# Patient Record
Sex: FEMALE | Race: WHITE | NOT HISPANIC OR LATINO | URBAN - METROPOLITAN AREA
[De-identification: names, ages, dates, MRNs, and addresses within clinical notes are randomized per-mention and may not be internally consistent; named-entity substitution may affect disease eponyms.]

---

## 2024-04-18 ENCOUNTER — TRANSCRIBE ORDERS (OUTPATIENT)
Dept: ONCOLOGY | Facility: CLINIC | Age: 69
End: 2024-04-18
Payer: MEDICARE

## 2024-04-18 ENCOUNTER — PRE VISIT (OUTPATIENT)
Dept: ONCOLOGY | Facility: CLINIC | Age: 69
End: 2024-04-18
Payer: MEDICARE

## 2024-04-18 ENCOUNTER — PATIENT OUTREACH (OUTPATIENT)
Dept: SURGERY | Facility: CLINIC | Age: 69
End: 2024-04-18
Payer: MEDICARE

## 2024-04-18 DIAGNOSIS — C16.9 GASTRIC CANCER (H): Primary | ICD-10-CM

## 2024-04-18 NOTE — TELEPHONE ENCOUNTER
RECORDS STATUS - ALL OTHER DIAGNOSIS      RECORDS RECEIVED FROM: Bigg/KATHY   Appt Date: TBD NN WQ    Gastric Cancer     ABDOMINAL IMAGING (CT SCANS, MRI, US, PET SCANS)  DATING BACK TO 2018  EUS REPORT  PATHOLOGY REPORT  EGD REPORTS     Records Requested     April 18, 2024 11:08 AM  [facility]   [facility]ility  [facility]    Outcome I called the IMG Dept Ph: [phone] - the scans were done at [facility] in [place] [phone] #3- they will push the scans to [facility] PACS     Imaging Received  April 18, 2024 11:50 AM SY   Action: Images from [facility] received and resolved to PACS.        NOTES STATUS DETAILS   OFFICE NOTE from referring provider Carolina Jaimes @ ONCOLOGY ADULT    OFFICE NOTE from other specialist External: KATHY 4/4/24: Dr. Parveen Paula   OPERATIVE REPORT External: MNSAMMY/ HANNAH- Bigg 4/8/24: EGD  5/19/23: EGD   CLINICAL TRIAL TREATMENTS TO DATE [facility]- Bigg    LABS     PATHOLOGY REPORTS Report in External: MNSAMMY/ HORACE [facility]   (Slides not req due to protocol) [facility]:  4/8/24: [id number] (positive)    5/19/23: [id number] (positive)   ANYTHING RELATED TO DIAGNOSIS Complete Labs last updated on 4/16/2024 in CE   IMAGING (NEED IMAGES & REPORT)     CT SCANS Requested (CE) [facility] in [place]  CT Abdomen Pelvis 4/12/2024, 2/16/2024, 10/23/2022    ULTRASOUND Requested (CE)  [facility] in [place]  US Abdomen Limited 10/23/2022   PET Requested (CE)  [facility] in [place]  4/16/2024

## 2024-04-18 NOTE — PROGRESS NOTES
New Patient Oncology Nurse Navigator Note     Referring provider: Dr. Perry     Referring Clinic/Organization: [facility]      Referred to: Surgical Oncology -  Hepatobiliary / GI Cancers     Requested provider (if applicable): First available provider    Referral Received: 04/18/24       Evaluation for : Gastric Cancer      Clinical History (per Nurse review of records provided):      See book marked documents:     Referring MD office note  Pathology report  Imaging reports   Procedure report         Clinical Assessment / Barriers to Care (Per Nurse):    None at this time.     Records Location:     [facility] Everywhere     Records Needed:     ABDOMINAL IMAGING (CT SCANS, MRI, US, PET SCANS)  DATING BACK TO 2018  EUS REPORT  PATHOLOGY REPORT  EGD REPORTS       Additional testing needed prior to consult:     NONE AT THIS TIME    Referral updates and Plan:       Consult with Surgical Oncology     04/18/2024 9:05 AM - Called and spoke with patient regarding referral and discussed options for consult this coming Monday. She was agreeable to plan and would like to proceed. Confirmed patient doesn't have planned EUS scheduled as of yet. Informed her we can still proceed with our appointment and discuss next steps. All questions answered and she was transferred to scheduling to finalize appointment details.     Joselyn Collazo, RN, BSN   Surgical Oncology New Patient Nurse Navigator  [facility]  [phone]

## 2024-04-22 ENCOUNTER — TELEPHONE (OUTPATIENT)
Dept: SURGERY | Facility: CLINIC | Age: 69
End: 2024-04-22

## 2024-04-22 ENCOUNTER — ONCOLOGY VISIT (OUTPATIENT)
Dept: SURGERY | Facility: CLINIC | Age: 69
End: 2024-04-22
Attending: SURGERY
Payer: MEDICARE

## 2024-04-22 VITALS
BODY MASS INDEX: 21.21 KG/M2 | RESPIRATION RATE: 16 BRPM | SYSTOLIC BLOOD PRESSURE: 108 MMHG | WEIGHT: 132 LBS | OXYGEN SATURATION: 99 % | HEART RATE: 79 BPM | DIASTOLIC BLOOD PRESSURE: 71 MMHG | HEIGHT: 66 IN | TEMPERATURE: 97.5 F

## 2024-04-22 DIAGNOSIS — C16.2 MALIGNANT NEOPLASM OF BODY OF STOMACH (H): Primary | ICD-10-CM

## 2024-04-22 PROCEDURE — G0463 HOSPITAL OUTPT CLINIC VISIT: HCPCS | Performed by: SURGERY

## 2024-04-22 PROCEDURE — 99205 OFFICE O/P NEW HI 60 MIN: CPT | Performed by: SURGERY

## 2024-04-22 RX ORDER — ALENDRONATE SODIUM 70 MG/1
70 TABLET ORAL
COMMUNITY

## 2024-04-22 RX ORDER — AMLODIPINE BESYLATE 10 MG/1
1 TABLET ORAL EVERY MORNING
COMMUNITY
Start: 2023-11-08

## 2024-04-22 RX ORDER — SODIUM PHOSPHATE,MONO-DIBASIC 19G-7G/118
2 ENEMA (ML) RECTAL EVERY MORNING
COMMUNITY

## 2024-04-22 RX ORDER — MULTIVITAMIN,THERAPEUTIC
1 TABLET ORAL EVERY MORNING
COMMUNITY

## 2024-04-22 RX ORDER — OMEPRAZOLE 40 MG/1
40 CAPSULE, DELAYED RELEASE ORAL 2 TIMES DAILY
COMMUNITY
Start: 2024-02-02

## 2024-04-22 ASSESSMENT — PAIN SCALES - GENERAL: PAINLEVEL: NO PAIN (0)

## 2024-04-22 NOTE — LETTER
4/22/2024         RE: Karen Fuller  [address]  [place] [address]        Dear Colleague,    Thank you for referring your patient, Karen Fuller, to the [facility]. Please see a copy of my visit note below.    Ms. Fuller is a 68-year-old female who was recently diagnosed with gastric adenocarcinoma, diffuse type.  Staging CT and PET scan revealed no evidence of metastatic disease.  She is otherwise healthy and exercises regularly.  Her ECOG performance status is 0.  She has met medical oncology and is preparing to start FLOT therapy.  She presents to [facility] to discuss surgical treatment.    I discussed the implications of newly diagnosed gastric cancer with the patient, her , and her daughter.  I agree with plans for endoscopic ultrasound.  On my review of her CT scan with her I did notice that she does have a couple of enlarged perigastric lymph nodes.  I discussed my recommendation that we proceed with a diagnostic laparoscopy and peritoneal washings to be sure that she does not have any evidence of peritoneal disease.  I discussed that if we identified any evidence of peritoneal disease, we would not plan any further surgical therapy and her treatment would be based on palliative chemotherapy.    Hopefully we will not identify any evidence of peritoneal disease with her diagnostic laparoscopy and in that case we would plan for a robotic subtotal gastrectomy following 4 cycles of FLOT therapy with Dr. Perry.  I discussed the details of that surgery including drawing pictures for the patient.  I discussed risks of surgery including but not limited to bleeding, infection, anastomotic leak requiring additional drains or procedures, venous thromboembolism, changes to diet and bowel habits including delayed gastric emptying or dumping syndrome, and the risk of open surgery.  Given her overall fitness I think she will tolerate surgery exceptionally  well.    I think it would be reasonable to start chemotherapy as quickly as possible.  In the meantime we will make plans for diagnostic laparoscopy in the coming week or 2.  Barring any evidence of peritoneal disease I will plan to see this patient after 4 cycles of FLOT therapy with repeat CT chest abdomen pelvis, labs, and a preoperative discussion for surgical planning.    45 minutes was spent in face-to-face time.  15 minutes was spent in review of history, imaging, coordination of care.  5 minutes was spent in documentation.    The above was transcribed using Dragon voice recognition software that is now required for use by [facility] and [facility] in place of transcription of dictated notes.  This change may unfortunately lead into an increase in errors in the EMR. While I reviewed and edited the transcription, I may miss errors.  Please let me know of any of serious errors and I will addend the note.     Sincerely,        Jaguar Mathis MD

## 2024-04-22 NOTE — PROGRESS NOTES
Ms. Fuller is a 68-year-old female who was recently diagnosed with gastric adenocarcinoma, diffuse type.  Staging CT and PET scan revealed no evidence of metastatic disease.  She is otherwise healthy and exercises regularly.  Her ECOG performance status is 0.  She has met medical oncology and is preparing to start FLOT therapy.  She presents to [facility] to discuss surgical treatment.    I discussed the implications of newly diagnosed gastric cancer with the patient, her , and her daughter.  I agree with plans for endoscopic ultrasound.  On my review of her CT scan with her I did notice that she does have a couple of enlarged perigastric lymph nodes.  I discussed my recommendation that we proceed with a diagnostic laparoscopy and peritoneal washings to be sure that she does not have any evidence of peritoneal disease.  I discussed that if we identified any evidence of peritoneal disease, we would not plan any further surgical therapy and her treatment would be based on palliative chemotherapy.    Hopefully we will not identify any evidence of peritoneal disease with her diagnostic laparoscopy and in that case we would plan for a robotic subtotal gastrectomy following 4 cycles of FLOT therapy with Dr. Perry.  I discussed the details of that surgery including drawing pictures for the patient.  I discussed risks of surgery including but not limited to bleeding, infection, anastomotic leak requiring additional drains or procedures, venous thromboembolism, changes to diet and bowel habits including delayed gastric emptying or dumping syndrome, and the risk of open surgery.  Given her overall fitness I think she will tolerate surgery exceptionally well.    I think it would be reasonable to start chemotherapy as quickly as possible.  In the meantime we will make plans for diagnostic laparoscopy in the coming week or 2.  Barring any evidence of peritoneal disease I will plan to see this patient after  4 cycles of FLOT therapy with repeat CT chest abdomen pelvis, labs, and a preoperative discussion for surgical planning.    45 minutes was spent in face-to-face time.  15 minutes was spent in review of history, imaging, coordination of care.  5 minutes was spent in documentation.    The above was transcribed using Dragon voice recognition software that is now required for use by [facility] and [facility] in place of transcription of dictated notes.  This change may unfortunately lead into an increase in errors in the EMR. While I reviewed and edited the transcription, I may miss errors.  Please let me know of any of serious errors and I will addend the note.

## 2024-04-22 NOTE — TELEPHONE ENCOUNTER
Called patient to discuss surgery scheduling with Dr. Mathis. Spoke with patient and scheduled surgery with Dr. Mathis. Surgery is scheduled at [facility] for 4/30.    H&P scheduled with PAC virtually for 4/25. Patient is aware they will get their arrival time for surgery at PAC appointment.    Post-op scheduled for 5/29 with Dr. Mathis via phone.    Surgery packet provided in clinic per patient.    The patient has no further questions regarding surgery at this time. Patient has writers call back number [phone].    Marcia Joseph on 4/22/2024 at 3:54 PM

## 2024-04-23 NOTE — TELEPHONE ENCOUNTER
FUTURE VISIT INFORMATION      SURGERY INFORMATION:  Date: 24  Location: uu or  Surgeon:  Jaguar Mathis MD   Anesthesia Type:  general  Procedure: DIAGNOSTIC LAPAROSCOPY, LAPAROSCOPIC PERITONEAL WASHINGS AND POSSIBLE BIOPSIES   Consult: ov 24    RECORDS REQUESTED FROM:       Primary Care Provider: Jef    Most recent EKG+ Tracin24- [facility]

## 2024-04-25 ENCOUNTER — PRE VISIT (OUTPATIENT)
Dept: SURGERY | Facility: CLINIC | Age: 69
End: 2024-04-25

## 2024-04-25 ENCOUNTER — ANESTHESIA EVENT (OUTPATIENT)
Dept: SURGERY | Facility: CLINIC | Age: 69
End: 2024-04-25
Payer: MEDICARE

## 2024-04-25 ENCOUNTER — VIRTUAL VISIT (OUTPATIENT)
Dept: SURGERY | Facility: CLINIC | Age: 69
End: 2024-04-25
Payer: MEDICARE

## 2024-04-25 VITALS — WEIGHT: 130 LBS | BODY MASS INDEX: 20.89 KG/M2 | HEIGHT: 66 IN

## 2024-04-25 DIAGNOSIS — C16.2 MALIGNANT NEOPLASM OF BODY OF STOMACH (H): ICD-10-CM

## 2024-04-25 DIAGNOSIS — Z01.818 PREOP EXAMINATION: Primary | ICD-10-CM

## 2024-04-25 PROCEDURE — 99203 OFFICE O/P NEW LOW 30 MIN: CPT | Mod: 95 | Performed by: CLINICAL NURSE SPECIALIST

## 2024-04-25 RX ORDER — DIPHENHYDRAMINE HCL 25 MG
25 CAPSULE ORAL
COMMUNITY

## 2024-04-25 RX ORDER — IBUPROFEN 200 MG
2 CAPSULE ORAL EVERY MORNING
COMMUNITY

## 2024-04-25 RX ORDER — FAMOTIDINE 20 MG/1
20 TABLET, FILM COATED ORAL 2 TIMES DAILY PRN
COMMUNITY

## 2024-04-25 RX ORDER — LORAZEPAM 0.5 MG/1
0.5 TABLET ORAL DAILY PRN
COMMUNITY
Start: 2024-04-12

## 2024-04-25 RX ORDER — LIDOCAINE 40 MG/G
CREAM TOPICAL
Status: CANCELLED | OUTPATIENT
Start: 2024-04-25

## 2024-04-25 RX ORDER — LACTOBACILLUS RHAMNOSUS GG 10B CELL
1 CAPSULE ORAL EVERY MORNING
COMMUNITY

## 2024-04-25 RX ORDER — SODIUM CHLORIDE, SODIUM LACTATE, POTASSIUM CHLORIDE, CALCIUM CHLORIDE 600; 310; 30; 20 MG/100ML; MG/100ML; MG/100ML; MG/100ML
INJECTION, SOLUTION INTRAVENOUS CONTINUOUS
Status: CANCELLED | OUTPATIENT
Start: 2024-04-25

## 2024-04-25 ASSESSMENT — ENCOUNTER SYMPTOMS
SEIZURES: 0
DYSRHYTHMIAS: 0

## 2024-04-25 ASSESSMENT — LIFESTYLE VARIABLES: TOBACCO_USE: 1

## 2024-04-25 ASSESSMENT — PAIN SCALES - GENERAL: PAINLEVEL: NO PAIN (0)

## 2024-04-25 NOTE — PROGRESS NOTES
Bri is a 68 year old who is being evaluated via a billable video visit.    How would you like to obtain your AVS? MyChart  If the video visit is dropped, the invitation should be resent by: Text to cell phone: [phone]    Subjective   Bri is a 68 year old, presenting for the following health issues:  Pre-Op Exam      HPI           Physical Exam

## 2024-04-25 NOTE — PATIENT INSTRUCTIONS
Preparing for Your Surgery      Name:  Karen Fuller   MRN:  [id number]   :  1955   Today's Date:  2024       Arriving for surgery:  Surgery date:  24  Arrival time:  2.05PM    Please come to:     Please come to:       [facility]    [address] [place]  [address]     The [facility] Patient/Visitor Ramp is at [address]. Patients and visitors who self-park will receive the reduced hospital parking rate. If the Patient /Visitor Ramp is full, please follow the signs to the [employer] car park located at the main hospital entrance.       parking is available (24 hours/ 7 days a week)      Discounted parking pass options are available for patients and visitors. They can be purchased at the [employer] desk at the main hospital entrance.     -    Stop at the security desk and they will direct surgery patients to the [facility] [phone]        - If you need directions, a wheelchair or an escort please stop at the Information/security desk in the lobby.     What can I eat or drink?  -  You may eat and drink normally up to 8 hours prior to arrival time. (Until 6.05AM)  -  You may have clear liquids until 2 hours prior to arrival time. (Until 12.05PM)    Examples of clear liquids:  Water  Clear broth  Juices (apple, white grape, white cranberry  and cider) without pulp  Noncarbonated, powder based beverages  (lemonade and Tao-Aid)  Sodas (Sprite, 7-Up, ginger ale and seltzer)  Coffee or tea (without milk or cream)  Gatorade    -  No Alcohol or cannabis products for at least 24 hours before surgery.     Which medicines can I take?    Hold Aspirin for 7 days before surgery.   Hold Multivitamins for 7 days before surgery. (Thera-tabs)  Hold Supplements for 7 days before surgery. (Glucosamine-chondroitin)  Hold Ibuprofen (Advil, Motrin) for 1  day(s) before surgery--unless otherwise directed by surgeon.  Hold Naproxen (Aleve) for 4 days before surgery.    -  DO NOT take these medications the day of surgery:  Alendronate (Fosamax), Simethicone.    -  PLEASE TAKE these medications the day of surgery:  Amlodipine (Norvasc), Omeprazole (Prilosec), Pepcid as needed. Ativan as needed.    How do I prepare myself?  - Please take 2 showers (one the night prior to surgery and one the morning of surgery) using Scrubcare or Hibiclens soap.    Use this soap only from the neck to your toes.     Leave the soap on your skin for one minute--then rinse thoroughly.      You may use your own shampoo and conditioner. No other hair products.   - Please remove all jewelry and body piercings.  - No lotions, deodorants or fragrance.  - No makeup or fingernail polish.   - Bring your ID and insurance card.    -If you use a CPAP machine, please bring the CPAP machine, tubing, and mask to hospital.    -If you have a Deep Brain Stimulator, Spinal Cord Stimulator, or any Neuro Stimulator device---you must bring the remote control to the hospital.      ALL PATIENTS GOING HOME THE SAME DAY OF SURGERY ARE REQUIRED TO HAVE A RESPONSIBLE ADULT TO DRIVE AND BE IN ATTENDANCE WITH THEM FOR 24 HOURS FOLLOWING SURGERY.    Covid testing policy as of 12/06/2022  Your surgeon will notify and schedule you for a COVID test if one is needed before surgery--please direct any questions or COVID symptoms to your surgeon      Questions or Concerns:    - For any questions regarding the day of surgery or your hospital stay, please contact the Pre Admission Nursing Office at [phone].       - If you have health changes between today and your surgery, please call your surgeon.       - For questions after surgery, please call your surgeons office.           Current Visitor Guidelines    You may have 2 visitors in the pre op area.    Visiting hours: 8 a.m. to 8:30 p.m.    Patients confirmed or suspected to  have symptoms of COVID 19 or flu:     No visitors allowed for adult patients.   Children (under age 18) can have 1 named visitor.     People who are sick or showing symptoms of COVID 19 or flu:    Are not allowed to visit patients--we can only make exceptions in special situations.       Please follow these guidelines for your visit:          Please maintain social distance          Masking is optional--however at times you may be asked to wear a mask for the safety of yourself and others     Clean your hands with alcohol hand . Do this when you arrive at and leave the building and patient room,    And again after you touch your mask or anything in the room.     Go directly to and from the room you are visiting.     Stay in the patient s room during your visit. Limit going to other places in the hospital as much as possible     Leave bags and jackets at home or in the car.     For everyone s health, please don t come and go during your visit. That includes for smoking   during your visit.

## 2024-04-25 NOTE — H&P
Pre-Operative H & P     CC:  Preoperative exam to assess for increased cardiopulmonary risk while undergoing surgery and anesthesia.    Date of Encounter: 4/25/2024  Primary Care Physician:  No Ref-Primary, Physician     Reason for visit:   Encounter Diagnoses   Name Primary?    Preop examination Yes    Malignant neoplasm of body of stomach (H)        MALKA Fuller is a 68 year old female who presents for pre-operative H & P in preparation for  Procedure Information       Case: [id number] Date/Time: 04/30/24 1605    Procedure: DIAGNOSTIC LAPAROSCOPY, LAPAROSCOPIC PERITONEAL WASHINGS AND POSSIBLE BIOPSIES (Abdomen)    Anesthesia type: General    Diagnosis: Malignant neoplasm of body of stomach (H) [C16.2]    Pre-op diagnosis: Malignant neoplasm of body of stomach (H) [C16.2]    Location:  OR  /  OR    Providers: Jaguar Mathis MD          History is obtained from the patient and chart review    Patient who was recently diagnosed with gastric adenocarcinoma, diffuse type. Staging CT and PET scan revealed no evidence of metastatic disease. She has met with medical oncology and is preparing to start FLOT therapy in near future. She was also referred to Dr. Mathis for additional evaluation. Her imaging was reviewed and she was counseled for above procedure to assess for evidence of peritoneal disease.     Her history is otherwise significant for hypertension, Raynauds, GERD, osteoporosis, and anxiety.    She is otherwise healthy and exercises regularly.      Hx of abnormal bleeding or anti-platelet use: Denies     Menstrual history: No LMP recorded. Patient is perimenopausal.     Past Medical History  Past Medical History:   Diagnosis Date    History of skin cancer     BCC    Hyponatremia     Malignant neoplasm of body of stomach (H)     Osteoporosis     Raynaud's syndrome        Past Surgical History  Past Surgical History:   Procedure Laterality Date    BUNIONECTOMY Bilateral     EGD with biopsy   2023       Prior to Admission Medications  Current Outpatient Medications   Medication Sig Dispense Refill    alendronate (FOSAMAX) 70 MG tablet Take 70 mg by mouth every 7 days WEDNESDAY'S      amLODIPine (NORVASC) 10 MG tablet Take 1 tablet by mouth every morning      calcium citrate (CITRACAL) 950 (200 Ca) MG tablet Take 2 tablets by mouth every morning      glucosamine-chondroitin 500-400 MG CAPS per capsule Take 2 capsules by mouth every morning      LORazepam (ATIVAN) 0.5 MG tablet Take 0.5 mg by mouth daily as needed for anxiety      Multiple Vitamin (THERA-TABS) TABS Take 1 tablet by mouth every morning      omeprazole (PRILOSEC) 40 MG DR capsule Take 40 mg by mouth 2 times daily      Simethicone 125 MG TABS Take 125 mg by mouth as needed      diphenhydrAMINE (BENADRYL) 25 MG capsule Take 25 mg by mouth nightly as needed      famotidine (PEPCID) 20 MG tablet Take 20 mg by mouth 2 times daily as needed      Lactobacillus Rhamnosus, GG, ([facility]) CAPS Take 1 capsule by mouth every morning         Allergies  Allergies   Allergen Reactions    Amoxicillin Hives    Cefadroxil Hives       Social History  Social History     Socioeconomic History    Marital status:      Spouse name: Not on file    Number of children: Not on file    Years of education: Not on file    Highest education level: Not on file   Occupational History    Not on file   Tobacco Use    Smoking status: Former     Current packs/day: 0.00     Types: Cigarettes     Quit date:      Years since quittin.3     Passive exposure: Past    Smokeless tobacco: Never   Substance and Sexual Activity    Alcohol use: Not Currently     Alcohol/week: 1.0 standard drink of alcohol     Types: 1 Standard drinks or equivalent per week     Comment: Not currently    Drug use: Never    Sexual activity: Not on file   Other Topics Concern    Not on file   Social History Narrative    Not on file     Social Determinants of Health      Financial Resource Strain: Not on file   Food Insecurity: Not on file   Transportation Needs: Not on file   Physical Activity: Sufficiently Active (11/8/2023)    Received from [facility]    Exercise Vital Sign     Days of Exercise per Week: 6 days     Minutes of Exercise per Session: 50 min   Stress: Not on file   Social Connections: Not on file   Interpersonal Safety: Not At Risk (2/16/2024)    Received from [facility]    Intimate Partner Violence     Are you in a relationship where you are physically hurt, threatened and/or made to feel afraid?: No   Housing Stability: Not on file       Family History  Family History   Problem Relation Age of Onset    Heart Disease Mother     Crohn's Disease Mother     Osteoporosis Mother     Rheumatoid Arthritis Mother     Heart Disease Father     Hyperlipidemia Father     Hypertension Father     Alcoholism Sister     Colon Polyps Sister     Osteoporosis Sister     Colon Polyps Sister     Osteoporosis Sister     Colon Polyps Sister     Osteoporosis Sister     Colon Polyps Sister     Crohn's Disease Maternal Uncle     Anesthesia Reaction No family hx of     Clotting Disorder No family hx of     Bleeding Disorder No family hx of        Review of Systems  The complete review of systems is negative other than noted in the HPI or here.   Anesthesia Evaluation   Pt has had prior anesthetic. Type: MAC.    No history of anesthetic complications       ROS/MED HX  ENT/Pulmonary:     (+)     HARLEEN risk factors,  hypertension,         tobacco use, Past use,                    (-) recent URI   Neurologic:    (-) no seizures and no CVA   Cardiovascular: Comment: Amlodipine initially started for Raynauds, which can be significant with white discoloration from finger tip to first knuckle    (+)  hypertension-range: controlled/ -   -  - -                                 Previous cardiac testing   Echo: Date: Results:    Stress Test:  Date:  Results:    ECG Reviewed:  Date: 24 Results:  SINUS RHYTHM   MINIMAL ST DEPRESSION [0.025+ mV ST DEPRESSION]   BORDERLINE ECG   Cath:  Date: Results:   (-) taking anticoagulants/antiplatelets, POLLARD and arrhythmias   METS/Exercise Tolerance: >4 METS Comment: Walks 4 miles daily, exercises circuit training, cardio   Hematologic:    (-) history of blood clots and history of blood transfusion   Musculoskeletal: Comment: Osteoporosis      GI/Hepatic:     (+) GERD, Asymptomatic on medication,                  Renal/Genitourinary:  - neg Renal ROS     Endo:  - neg endo ROS     Psychiatric/Substance Use:     (+) psychiatric history anxiety       Infectious Disease:  - neg infectious disease ROS     Malignancy:   (+) Malignancy, History of GI.GI CA  Active status post.      Other:  - neg other ROS          Virtual visit -  No vitals were obtained    Physical Exam  Constitutional: Awake, alert, no apparent distress, and appears stated age.  HENT: Normocephalic  Respiratory: non labored breathing; no cough   Neurologic: Oriented to name, place and time.   Neuropsychiatric: Calm, cooperative. Normal affect.      Prior Labs/Diagnostic Studies   All labs and imaging personally reviewed   OSH 24  WBC 4.2  Hgb  12.1  Hematocrit 36.9  Platelets 315    Na 135  K 4.1  Cl 105  Cr 0.70  Glu 110  LFTs normal    24  TSH 4.99  Free T3 2.0  Free T4 1.0    EK24 SINUS RHYTHM   MINIMAL ST DEPRESSION [0.025+ mV ST DEPRESSION]   BORDERLINE ECG     24 NM PET/CT    IMPRESSION:   1. Mild FDG uptake of the gastric body is not typically greater than expected   for physiologic activity the corresponds to the site of previous CT abnormality.   2.  No other FDG avid abnormalities of the imaged body.   3.  Mild bilateral symmetric prominent renal collecting system without   hydronephrotic morphology of doubtful clinical significance.     CT abd/pelvis 24      IMPRESSION:   1.  Persistent gastric antral wall thickening and  "irregularity though decreased   from prior and without obvious gastric outlet obstruction.  Oral contrast is   visualized throughout the small bowel.   2.  No findings to suggest distant metastasis.  No adenopathy or suspicious   masses.       The patient's records and results personally reviewed by this provider.     Outside records reviewed from: Care Everywhere      Assessment    Karen Fuller is a 68 year old female seen as a PAC referral for risk assessment and optimization for anesthesia.    Plan/Recommendations  Pt will be optimized for the proposed procedure.  See below for details on the assessment, risk, and preoperative recommendations    No history of general anesthesia or intubation    NEUROLOGY  - No history of TIA, CVA or seizure    -Post Op delirium risk factors:  No risk identified    ENT  - No current airway concerns.  Will need to be reassessed day of surgery.  Mallampati: Unable to assess  TM: Unable to assess    CARDIAC  HYPERTENSION. Amlodipine was initially for significant Raynauds. BP controlled.  No other cardiac history symptoms, or meds. Excellent exercise tolerance.  - METS (Metabolic Equivalents)>4    RCRI: 0.9% risk of serious cardiac events    PULMONARY  Denies asthma, cough or use of inhalers  Low risk for HARLEEN    - Tobacco History    History   Smoking Status    Former    Types: Cigarettes   Smokeless Tobacco    Never       GI: GERD. Controlled primarily with omeprazole TWICE DAILY, and will take on DOS. Pepcid prn for breakthrough symptoms   PONV Medium Risk  Total Score: 2           1 AN PONV: Pt is Female    1 AN PONV: Patient is not a current smoker        /RENAL  - Baseline Creatinine  0.70    ENDOCRINE   - BMI: Estimated body mass index is 20.98 kg/m  as calculated from the following:    Height as of this encounter: 1.676 m (5' 6\").    Weight as of this encounter: 59 kg (130 lb).  Healthy Weight (BMI 18.5-24.9)  - No history of Diabetes Mellitus    HEME VTE risk 3%  Denies " personal or family history of blood clots  Denies personal or family history of bleeding disorder  Denies history of blood transfusion     MSK: Osteoporosis    PSYCH  - Some anxiety related to procedures, testing etc. PRN use of Ativan    Different anesthesia methods/types have been discussed with the patient, but they are aware that the final plan will be decided by the assigned anesthesia provider on the date of service.    The patient is optimized for their procedure. AVS with information on surgery time/arrival time, meds and NPO status given by nursing staff. No further diagnostic testing indicated.    Please refer to the physical examination documented by the anesthesiologist in the anesthesia record on the day of surgery.    Video-Visit Details    Type of service:  Video Visit    Provider received verbal consent for a Video Visit from the patient? Yes   Video Start Time: 8:58am  Video End Time: 9:09am    Originating Location (pt. Location): Home    Distant Location (provider location):  Off-site  Mode of Communication:  Video Conference via [employer]  On the day of service:     Prep time: 13 minutes  Visit time: 11 minutes  Documentation time: 14 minutes  ------------------------------------------  Total time: 38 minutes      VIRAJ Avila CNS  Preoperative Assessment Center  [facility]  Clinic and Surgery Center  Phone: [phone]  Fax: [phone]

## 2024-04-29 ASSESSMENT — LIFESTYLE VARIABLES: TOBACCO_USE: 1

## 2024-04-29 ASSESSMENT — ENCOUNTER SYMPTOMS
SEIZURES: 0
DYSRHYTHMIAS: 0

## 2024-04-30 ENCOUNTER — ANESTHESIA (OUTPATIENT)
Dept: SURGERY | Facility: CLINIC | Age: 69
End: 2024-04-30
Payer: MEDICARE

## 2024-04-30 ENCOUNTER — HOSPITAL ENCOUNTER (OUTPATIENT)
Facility: CLINIC | Age: 69
Discharge: HOME | End: 2024-04-30
Attending: SURGERY | Admitting: SURGERY
Payer: MEDICARE

## 2024-04-30 VITALS
OXYGEN SATURATION: 95 % | BODY MASS INDEX: 21.33 KG/M2 | TEMPERATURE: 98.6 F | HEART RATE: 73 BPM | DIASTOLIC BLOOD PRESSURE: 82 MMHG | WEIGHT: 132.72 LBS | HEIGHT: 66 IN | RESPIRATION RATE: 16 BRPM | SYSTOLIC BLOOD PRESSURE: 109 MMHG

## 2024-04-30 DIAGNOSIS — C16.2 MALIGNANT NEOPLASM OF BODY OF STOMACH (H): Primary | ICD-10-CM

## 2024-04-30 LAB
ABO/RH(D): NORMAL
ANTIBODY SCREEN: NEGATIVE
SPECIMEN EXPIRATION DATE: NORMAL

## 2024-04-30 PROCEDURE — 250N000025 HC SEVOFLURANE, PER MIN: Performed by: SURGERY

## 2024-04-30 PROCEDURE — 272N000001 HC OR GENERAL SUPPLY STERILE: Performed by: SURGERY

## 2024-04-30 PROCEDURE — 49320 DIAG LAPARO SEPARATE PROC: CPT | Performed by: ANESTHESIOLOGY

## 2024-04-30 PROCEDURE — 250N000011 HC RX IP 250 OP 636: Mod: JZ | Performed by: STUDENT IN AN ORGANIZED HEALTH CARE EDUCATION/TRAINING PROGRAM

## 2024-04-30 PROCEDURE — 88342 IMHCHEM/IMCYTCHM 1ST ANTB: CPT | Mod: 26 | Performed by: PATHOLOGY

## 2024-04-30 PROCEDURE — 88112 CYTOPATH CELL ENHANCE TECH: CPT | Mod: 26 | Performed by: PATHOLOGY

## 2024-04-30 PROCEDURE — 88341 IMHCHEM/IMCYTCHM EA ADD ANTB: CPT | Mod: 26 | Performed by: PATHOLOGY

## 2024-04-30 PROCEDURE — 360N000076 HC SURGERY LEVEL 3, PER MIN: Performed by: SURGERY

## 2024-04-30 PROCEDURE — 88305 TISSUE EXAM BY PATHOLOGIST: CPT | Mod: TC | Performed by: SURGERY

## 2024-04-30 PROCEDURE — 258N000003 HC RX IP 258 OP 636: Performed by: ANESTHESIOLOGY

## 2024-04-30 PROCEDURE — 250N000011 HC RX IP 250 OP 636: Performed by: NURSE ANESTHETIST, CERTIFIED REGISTERED

## 2024-04-30 PROCEDURE — 36415 COLL VENOUS BLD VENIPUNCTURE: CPT | Performed by: CLINICAL NURSE SPECIALIST

## 2024-04-30 PROCEDURE — 258N000003 HC RX IP 258 OP 636: Performed by: NURSE ANESTHETIST, CERTIFIED REGISTERED

## 2024-04-30 PROCEDURE — 49320 DIAG LAPARO SEPARATE PROC: CPT | Mod: GC | Performed by: STUDENT IN AN ORGANIZED HEALTH CARE EDUCATION/TRAINING PROGRAM

## 2024-04-30 PROCEDURE — 370N000017 HC ANESTHESIA TECHNICAL FEE, PER MIN: Performed by: SURGERY

## 2024-04-30 PROCEDURE — 710N000012 HC RECOVERY PHASE 2, PER MINUTE: Performed by: SURGERY

## 2024-04-30 PROCEDURE — 250N000011 HC RX IP 250 OP 636: Performed by: STUDENT IN AN ORGANIZED HEALTH CARE EDUCATION/TRAINING PROGRAM

## 2024-04-30 PROCEDURE — 250N000009 HC RX 250: Performed by: STUDENT IN AN ORGANIZED HEALTH CARE EDUCATION/TRAINING PROGRAM

## 2024-04-30 PROCEDURE — 710N000010 HC RECOVERY PHASE 1, LEVEL 2, PER MIN: Performed by: SURGERY

## 2024-04-30 PROCEDURE — 250N000009 HC RX 250: Performed by: NURSE ANESTHETIST, CERTIFIED REGISTERED

## 2024-04-30 PROCEDURE — 86900 BLOOD TYPING SEROLOGIC ABO: CPT | Performed by: CLINICAL NURSE SPECIALIST

## 2024-04-30 PROCEDURE — 49320 DIAG LAPARO SEPARATE PROC: CPT | Performed by: NURSE ANESTHETIST, CERTIFIED REGISTERED

## 2024-04-30 PROCEDURE — 250N000013 HC RX MED GY IP 250 OP 250 PS 637: Performed by: STUDENT IN AN ORGANIZED HEALTH CARE EDUCATION/TRAINING PROGRAM

## 2024-04-30 PROCEDURE — 999N000141 HC STATISTIC PRE-PROCEDURE NURSING ASSESSMENT: Performed by: SURGERY

## 2024-04-30 PROCEDURE — 250N000011 HC RX IP 250 OP 636: Performed by: ANESTHESIOLOGY

## 2024-04-30 PROCEDURE — 88305 TISSUE EXAM BY PATHOLOGIST: CPT | Mod: 26 | Performed by: PATHOLOGY

## 2024-04-30 RX ORDER — HALOPERIDOL 5 MG/ML
1 INJECTION INTRAMUSCULAR
Status: DISCONTINUED | OUTPATIENT
Start: 2024-04-30 | End: 2024-04-30 | Stop reason: HOSPADM

## 2024-04-30 RX ORDER — ONDANSETRON 2 MG/ML
4 INJECTION INTRAMUSCULAR; INTRAVENOUS EVERY 30 MIN PRN
Status: DISCONTINUED | OUTPATIENT
Start: 2024-04-30 | End: 2024-04-30 | Stop reason: HOSPADM

## 2024-04-30 RX ORDER — HYDROMORPHONE HYDROCHLORIDE 1 MG/ML
0.4 INJECTION, SOLUTION INTRAMUSCULAR; INTRAVENOUS; SUBCUTANEOUS EVERY 5 MIN PRN
Status: DISCONTINUED | OUTPATIENT
Start: 2024-04-30 | End: 2024-04-30 | Stop reason: HOSPADM

## 2024-04-30 RX ORDER — FLUMAZENIL 0.1 MG/ML
0.2 INJECTION, SOLUTION INTRAVENOUS
Status: DISCONTINUED | OUTPATIENT
Start: 2024-04-30 | End: 2024-04-30 | Stop reason: HOSPADM

## 2024-04-30 RX ORDER — SODIUM CHLORIDE, SODIUM LACTATE, POTASSIUM CHLORIDE, CALCIUM CHLORIDE 600; 310; 30; 20 MG/100ML; MG/100ML; MG/100ML; MG/100ML
INJECTION, SOLUTION INTRAVENOUS CONTINUOUS
Status: DISCONTINUED | OUTPATIENT
Start: 2024-04-30 | End: 2024-04-30 | Stop reason: HOSPADM

## 2024-04-30 RX ORDER — FENTANYL CITRATE 50 UG/ML
INJECTION, SOLUTION INTRAMUSCULAR; INTRAVENOUS PRN
Status: DISCONTINUED | OUTPATIENT
Start: 2024-04-30 | End: 2024-04-30

## 2024-04-30 RX ORDER — BUPIVACAINE HYDROCHLORIDE 2.5 MG/ML
INJECTION, SOLUTION EPIDURAL; INFILTRATION; INTRACAUDAL
Status: COMPLETED | OUTPATIENT
Start: 2024-04-30 | End: 2024-04-30

## 2024-04-30 RX ORDER — CLINDAMYCIN PHOSPHATE 900 MG/50ML
900 INJECTION, SOLUTION INTRAVENOUS SEE ADMIN INSTRUCTIONS
Status: DISCONTINUED | OUTPATIENT
Start: 2024-04-30 | End: 2024-04-30 | Stop reason: HOSPADM

## 2024-04-30 RX ORDER — LIDOCAINE 40 MG/G
CREAM TOPICAL
Status: DISCONTINUED | OUTPATIENT
Start: 2024-04-30 | End: 2024-04-30 | Stop reason: HOSPADM

## 2024-04-30 RX ORDER — PROPOFOL 10 MG/ML
INJECTION, EMULSION INTRAVENOUS PRN
Status: DISCONTINUED | OUTPATIENT
Start: 2024-04-30 | End: 2024-04-30

## 2024-04-30 RX ORDER — DEXAMETHASONE SODIUM PHOSPHATE 4 MG/ML
INJECTION, SOLUTION INTRA-ARTICULAR; INTRALESIONAL; INTRAMUSCULAR; INTRAVENOUS; SOFT TISSUE PRN
Status: DISCONTINUED | OUTPATIENT
Start: 2024-04-30 | End: 2024-04-30

## 2024-04-30 RX ORDER — ONDANSETRON 4 MG/1
4 TABLET, ORALLY DISINTEGRATING ORAL EVERY 30 MIN PRN
Status: DISCONTINUED | OUTPATIENT
Start: 2024-04-30 | End: 2024-04-30 | Stop reason: HOSPADM

## 2024-04-30 RX ORDER — OXYCODONE HYDROCHLORIDE 5 MG/1
5 TABLET ORAL
Status: DISCONTINUED | OUTPATIENT
Start: 2024-04-30 | End: 2024-04-30 | Stop reason: HOSPADM

## 2024-04-30 RX ORDER — FENTANYL CITRATE 50 UG/ML
25-50 INJECTION, SOLUTION INTRAMUSCULAR; INTRAVENOUS
Status: DISCONTINUED | OUTPATIENT
Start: 2024-04-30 | End: 2024-04-30 | Stop reason: HOSPADM

## 2024-04-30 RX ORDER — NALOXONE HYDROCHLORIDE 0.4 MG/ML
0.2 INJECTION, SOLUTION INTRAMUSCULAR; INTRAVENOUS; SUBCUTANEOUS
Status: DISCONTINUED | OUTPATIENT
Start: 2024-04-30 | End: 2024-04-30 | Stop reason: HOSPADM

## 2024-04-30 RX ORDER — ACETAMINOPHEN 325 MG/1
650 TABLET ORAL EVERY 4 HOURS PRN
Qty: 50 TABLET | Refills: 0 | Status: SHIPPED | OUTPATIENT
Start: 2024-04-30

## 2024-04-30 RX ORDER — HYDROMORPHONE HYDROCHLORIDE 1 MG/ML
0.2 INJECTION, SOLUTION INTRAMUSCULAR; INTRAVENOUS; SUBCUTANEOUS EVERY 5 MIN PRN
Status: DISCONTINUED | OUTPATIENT
Start: 2024-04-30 | End: 2024-04-30 | Stop reason: HOSPADM

## 2024-04-30 RX ORDER — ENOXAPARIN SODIUM 100 MG/ML
40 INJECTION SUBCUTANEOUS
Status: COMPLETED | OUTPATIENT
Start: 2024-04-30 | End: 2024-04-30

## 2024-04-30 RX ORDER — OXYCODONE HYDROCHLORIDE 5 MG/1
5-10 TABLET ORAL EVERY 6 HOURS PRN
Qty: 12 TABLET | Refills: 0 | Status: SHIPPED | OUTPATIENT
Start: 2024-04-30

## 2024-04-30 RX ORDER — DEXAMETHASONE SODIUM PHOSPHATE 10 MG/ML
INJECTION, SOLUTION INTRAMUSCULAR; INTRAVENOUS
Status: COMPLETED | OUTPATIENT
Start: 2024-04-30 | End: 2024-04-30

## 2024-04-30 RX ORDER — HYDRALAZINE HYDROCHLORIDE 20 MG/ML
2.5-5 INJECTION INTRAMUSCULAR; INTRAVENOUS EVERY 10 MIN PRN
Status: DISCONTINUED | OUTPATIENT
Start: 2024-04-30 | End: 2024-04-30 | Stop reason: HOSPADM

## 2024-04-30 RX ORDER — ACETAMINOPHEN 325 MG/1
975 TABLET ORAL ONCE
Status: COMPLETED | OUTPATIENT
Start: 2024-04-30 | End: 2024-04-30

## 2024-04-30 RX ORDER — CLINDAMYCIN PHOSPHATE 900 MG/50ML
900 INJECTION, SOLUTION INTRAVENOUS
Status: COMPLETED | OUTPATIENT
Start: 2024-04-30 | End: 2024-04-30

## 2024-04-30 RX ORDER — SERTRALINE HYDROCHLORIDE 25 MG/1
25 TABLET, FILM COATED ORAL DAILY
COMMUNITY

## 2024-04-30 RX ORDER — OXYCODONE HYDROCHLORIDE 10 MG/1
10 TABLET ORAL
Status: DISCONTINUED | OUTPATIENT
Start: 2024-04-30 | End: 2024-04-30 | Stop reason: HOSPADM

## 2024-04-30 RX ORDER — LABETALOL HYDROCHLORIDE 5 MG/ML
10 INJECTION, SOLUTION INTRAVENOUS
Status: DISCONTINUED | OUTPATIENT
Start: 2024-04-30 | End: 2024-04-30 | Stop reason: HOSPADM

## 2024-04-30 RX ORDER — ONDANSETRON 4 MG/1
4 TABLET, ORALLY DISINTEGRATING ORAL EVERY 6 HOURS PRN
Qty: 14 TABLET | Refills: 0 | Status: SHIPPED | OUTPATIENT
Start: 2024-04-30

## 2024-04-30 RX ORDER — NALOXONE HYDROCHLORIDE 0.4 MG/ML
0.1 INJECTION, SOLUTION INTRAMUSCULAR; INTRAVENOUS; SUBCUTANEOUS
Status: DISCONTINUED | OUTPATIENT
Start: 2024-04-30 | End: 2024-04-30 | Stop reason: HOSPADM

## 2024-04-30 RX ORDER — ONDANSETRON 2 MG/ML
INJECTION INTRAMUSCULAR; INTRAVENOUS PRN
Status: DISCONTINUED | OUTPATIENT
Start: 2024-04-30 | End: 2024-04-30

## 2024-04-30 RX ORDER — NALOXONE HYDROCHLORIDE 0.4 MG/ML
0.4 INJECTION, SOLUTION INTRAMUSCULAR; INTRAVENOUS; SUBCUTANEOUS
Status: DISCONTINUED | OUTPATIENT
Start: 2024-04-30 | End: 2024-04-30 | Stop reason: HOSPADM

## 2024-04-30 RX ORDER — LIDOCAINE HYDROCHLORIDE 20 MG/ML
INJECTION, SOLUTION INFILTRATION; PERINEURAL PRN
Status: DISCONTINUED | OUTPATIENT
Start: 2024-04-30 | End: 2024-04-30

## 2024-04-30 RX ORDER — DEXMEDETOMIDINE HYDROCHLORIDE 4 UG/ML
INJECTION, SOLUTION INTRAVENOUS
Status: COMPLETED | OUTPATIENT
Start: 2024-04-30 | End: 2024-04-30

## 2024-04-30 RX ADMIN — FENTANYL CITRATE 50 MCG: 50 INJECTION, SOLUTION INTRAMUSCULAR; INTRAVENOUS at 15:32

## 2024-04-30 RX ADMIN — ONDANSETRON 4 MG: 2 INJECTION INTRAMUSCULAR; INTRAVENOUS at 17:26

## 2024-04-30 RX ADMIN — DEXMEDETOMIDINE 40 MCG: 100 INJECTION, SOLUTION, CONCENTRATE INTRAVENOUS at 15:39

## 2024-04-30 RX ADMIN — PHENYLEPHRINE HYDROCHLORIDE 100 MCG: 10 INJECTION INTRAVENOUS at 16:58

## 2024-04-30 RX ADMIN — HYDROMORPHONE HYDROCHLORIDE 0.4 MG: 1 INJECTION, SOLUTION INTRAMUSCULAR; INTRAVENOUS; SUBCUTANEOUS at 18:35

## 2024-04-30 RX ADMIN — CLINDAMYCIN IN 5 PERCENT DEXTROSE 900 MG: 18 INJECTION, SOLUTION INTRAVENOUS at 13:06

## 2024-04-30 RX ADMIN — PHENYLEPHRINE HYDROCHLORIDE 100 MCG: 10 INJECTION INTRAVENOUS at 17:03

## 2024-04-30 RX ADMIN — MIDAZOLAM 0.5 MG: 1 INJECTION INTRAMUSCULAR; INTRAVENOUS at 15:31

## 2024-04-30 RX ADMIN — Medication 50 MG: at 16:50

## 2024-04-30 RX ADMIN — PROPOFOL 50 MG: 10 INJECTION, EMULSION INTRAVENOUS at 16:49

## 2024-04-30 RX ADMIN — SODIUM CHLORIDE, POTASSIUM CHLORIDE, SODIUM LACTATE AND CALCIUM CHLORIDE: 600; 310; 30; 20 INJECTION, SOLUTION INTRAVENOUS at 16:41

## 2024-04-30 RX ADMIN — ENOXAPARIN SODIUM 40 MG: 40 INJECTION SUBCUTANEOUS at 13:06

## 2024-04-30 RX ADMIN — BUPIVACAINE HYDROCHLORIDE 40 ML: 2.5 INJECTION, SOLUTION EPIDURAL; INFILTRATION; INTRACAUDAL; PERINEURAL at 15:39

## 2024-04-30 RX ADMIN — ACETAMINOPHEN 975 MG: 325 TABLET, FILM COATED ORAL at 13:06

## 2024-04-30 RX ADMIN — PHENYLEPHRINE HYDROCHLORIDE 200 MCG: 10 INJECTION INTRAVENOUS at 16:51

## 2024-04-30 RX ADMIN — LIDOCAINE HYDROCHLORIDE 100 MG: 20 INJECTION, SOLUTION INFILTRATION; PERINEURAL at 16:48

## 2024-04-30 RX ADMIN — ONDANSETRON 4 MG: 2 INJECTION INTRAMUSCULAR; INTRAVENOUS at 18:18

## 2024-04-30 RX ADMIN — DEXAMETHASONE SODIUM PHOSPHATE 4 MG: 4 INJECTION, SOLUTION INTRA-ARTICULAR; INTRALESIONAL; INTRAMUSCULAR; INTRAVENOUS; SOFT TISSUE at 17:12

## 2024-04-30 RX ADMIN — DEXAMETHASONE SODIUM PHOSPHATE 2 MG: 10 INJECTION, SOLUTION INTRAMUSCULAR; INTRAVENOUS at 15:39

## 2024-04-30 RX ADMIN — HYDROMORPHONE HYDROCHLORIDE 0.2 MG: 1 INJECTION, SOLUTION INTRAMUSCULAR; INTRAVENOUS; SUBCUTANEOUS at 18:10

## 2024-04-30 RX ADMIN — FENTANYL CITRATE 100 MCG: 50 INJECTION INTRAMUSCULAR; INTRAVENOUS at 16:48

## 2024-04-30 RX ADMIN — SUGAMMADEX 100 MG: 100 INJECTION, SOLUTION INTRAVENOUS at 17:39

## 2024-04-30 ASSESSMENT — ACTIVITIES OF DAILY LIVING (ADL)
ADLS_ACUITY_SCORE: 20
ADLS_ACUITY_SCORE: 29
ADLS_ACUITY_SCORE: 20
ADLS_ACUITY_SCORE: 31
ADLS_ACUITY_SCORE: 31
ADLS_ACUITY_SCORE: 20

## 2024-04-30 NOTE — PROGRESS NOTES
Bilateral TAP block performed. 0.5mg versed & 50 mcg fentanyl given per request. Patient reported feeling like having to faint following procedure due to pain from procedure. BPs soft but maintaining. Cool wash clothed provided and continue to monitor VS.

## 2024-04-30 NOTE — ANESTHESIA CARE TRANSFER NOTE
Patient: Karen Fuller    Procedure: Procedure(s):  DIAGNOSTIC LAPAROSCOPY, LAPAROSCOPIC PERITONEAL WASHINGS       Diagnosis: Malignant neoplasm of body of stomach (H) [C16.2]  Diagnosis Additional Information: No value filed.    Anesthesia Type:   General     Note:    Oropharynx: oropharynx clear of all foreign objects  Level of Consciousness: awake  Oxygen Supplementation: nasal cannula  Level of Supplemental Oxygen (L/min / FiO2): 4  Independent Airway: airway patency satisfactory and stable  Dentition: dentition unchanged  Vital Signs Stable: post-procedure vital signs reviewed and stable  Report to RN Given: handoff report given  Patient transferred to: PACU    Handoff Report: Identifed the Patient, Identified the Reponsible Provider, Reviewed the pertinent medical history, Discussed the surgical course, Reviewed Intra-OP anesthesia mangement and issues during anesthesia, Set expectations for post-procedure period and Allowed opportunity for questions and acknowledgement of understanding      Vitals:  Vitals Value Taken Time   /93 04/30/24 1757   Temp     Pulse 72 04/30/24 1759   Resp 14 04/30/24 1759   SpO2 99 % 04/30/24 1759   Vitals shown include unfiled device data.    Electronically Signed By: VIRAJ Caldera CRNA  April 30, 2024  5:59 PM   ITP dx during hospital admission from 11/6/21-11/11/21. Treated with dexamethasone 40mg x4 and IVIG 1g/kg x 2 days.      Short duration of remission and re-admitted from 12/17/21-12/21/21 for ITP. Given pulse dex and IVIG x2. Plt count recovery > 500k.     Will start weekly rituximab x 4 to achieve long duration of remission. Tolerating well. Continue C3.

## 2024-04-30 NOTE — ANESTHESIA POSTPROCEDURE EVALUATION
Patient: Karen Fuller    Procedure: Procedure(s):  DIAGNOSTIC LAPAROSCOPY, LAPAROSCOPIC PERITONEAL WASHINGS       Anesthesia Type:  General    Note:  Disposition: Admission   Postop Pain Control: Uneventful            Sign Out: Well controlled pain   PONV: No   Neuro/Psych: Uneventful            Sign Out: Acceptable/Baseline neuro status   Airway/Respiratory: Uneventful            Sign Out: Acceptable/Baseline resp. status   CV/Hemodynamics: Uneventful            Sign Out: Acceptable CV status; No obvious hypovolemia; No obvious fluid overload   Other NRE: NONE   DID A NON-ROUTINE EVENT OCCUR? No           Last vitals:  Vitals Value Taken Time   /59 04/30/24 1830   Temp 36.1  C (96.9  F) 04/30/24 1800   Pulse 65 04/30/24 1839   Resp 16 04/30/24 1839   SpO2 99 % 04/30/24 1839   Vitals shown include unfiled device data.    Electronically Signed By: Ollie Pelletier MD, MD  April 30, 2024  6:40 PM

## 2024-04-30 NOTE — ANESTHESIA PROCEDURE NOTES
Airway       Patient location during procedure: OR       Procedure Start/Stop Times: 4/30/2024 4:52 PM  Staff -        Anesthesiologist:  Lucas Harrington MD       CRNA: Cyndee Dukes APRN CRNA       Performed By: CRNA  Consent for Airway        Urgency: elective  Indications and Patient Condition       Indications for airway management: deon-procedural       Induction type:intravenous       Mask difficulty assessment: 1 - vent by mask    Final Airway Details       Final airway type: endotracheal airway       Successful airway: ETT - single  Endotracheal Airway Details        ETT size (mm): 7.0       Cuffed: yes       Successful intubation technique: direct laryngoscopy       DL Blade Type: MAC 3       Grade View of Cords: 1       Adjucts: stylet       Position: Right       Measured from: gums/teeth       Secured at (cm): 22       Bite block used: None    Post intubation assessment        Placement verified by: capnometry, equal breath sounds and chest rise        Number of attempts at approach: 1       Number of other approaches attempted: 0       Secured with: pink tape       Ease of procedure: easy       Dentition: Intact and Unchanged    Medication(s) Administered   Medication Administration Time: 4/30/2024 4:52 PM

## 2024-04-30 NOTE — DISCHARGE INSTRUCTIONS
Contacting your Doctor -   To contact a doctor, call Dr Mathis's office at [phone] (clinic) or Jovita VALENZUELA --[phone]   or:  [phone] and ask for the resident on call for Surgery (answered 24 hours a day)   Emergency Department:  [facility]: [phone]  [facility]: [phone] 911 if you are in need of immediate or emergent help

## 2024-04-30 NOTE — ANESTHESIA PREPROCEDURE EVALUATION
Anesthesia Pre-Procedure Evaluation    Patient: Karen Fuller   MRN: [id number] : 1955        Procedure : Procedure(s):  DIAGNOSTIC LAPAROSCOPY, LAPAROSCOPIC PERITONEAL WASHINGS AND POSSIBLE BIOPSIES          Past Medical History:   Diagnosis Date    History of skin cancer     BCC    Hyponatremia     Malignant neoplasm of body of stomach (H)     Osteoporosis     Raynaud's syndrome       Past Surgical History:   Procedure Laterality Date    BUNIONECTOMY Bilateral     EGD with biopsy  2023      Allergies   Allergen Reactions    Amoxicillin Hives    Cefadroxil Hives      Social History     Tobacco Use    Smoking status: Former     Current packs/day: 0.00     Types: Cigarettes     Quit date:      Years since quittin.3     Passive exposure: Past    Smokeless tobacco: Never   Substance Use Topics    Alcohol use: Not Currently     Alcohol/week: 1.0 standard drink of alcohol     Types: 1 Standard drinks or equivalent per week     Comment: Not currently      Wt Readings from Last 1 Encounters:   24 59 kg (130 lb)        Anesthesia Evaluation   Pt has had prior anesthetic. Type: MAC.    No history of anesthetic complications       ROS/MED HX  ENT/Pulmonary:     (+)     HARLEEN risk factors,  hypertension,         tobacco use, Past use,                    (-) recent URI   Neurologic:    (-) no seizures and no CVA   Cardiovascular: Comment: Amlodipine initially started for Raynauds, which can be significant with white discoloration from finger tip to first knuckle    (+)  hypertension-range: controlled/ -   -  - -                                 Previous cardiac testing   Echo: Date: Results:    Stress Test:  Date: Results:    ECG Reviewed:  Date: 24 Results:  SINUS RHYTHM   MINIMAL ST DEPRESSION [0.025+ mV ST DEPRESSION]   BORDERLINE ECG   Cath:  Date: Results:   (-) taking anticoagulants/antiplatelets, POLLARD and arrhythmias   METS/Exercise Tolerance: >4 METS Comment: Walks 4 miles daily,  Clothing "exercises circuit training, cardio   Hematologic:    (-) history of blood clots and history of blood transfusion   Musculoskeletal: Comment: Osteoporosis      GI/Hepatic:     (+) GERD, Asymptomatic on medication,                  Renal/Genitourinary:  - neg Renal ROS     Endo:  - neg endo ROS     Psychiatric/Substance Use:     (+) psychiatric history anxiety       Infectious Disease:  - neg infectious disease ROS     Malignancy:   (+) Malignancy, History of GI.GI CA  Active status post.      Other:  - neg other ROS          Physical Exam    Airway        Mallampati: II   TM distance: > 3 FB   Neck ROM: full   Mouth opening: > 3 cm    Respiratory Devices and Support         Dental       (+) Modest Abnormalities - crowns, retainers, 1 or 2 missing teeth      Cardiovascular          Rhythm and rate: regular and normal     Pulmonary           breath sounds clear to auscultation   (-) no rhonchi and no rales        OUTSIDE LABS:  CBC: No results found for: \"WBC\", \"HGB\", \"HCT\", \"PLT\"  BMP: No results found for: \"NA\", \"POTASSIUM\", \"CHLORIDE\", \"CO2\", \"BUN\", \"CR\", \"GLC\"  COAGS: No results found for: \"PTT\", \"INR\", \"FIBR\"  POC: No results found for: \"BGM\", \"HCG\", \"HCGS\"  HEPATIC: No results found for: \"ALBUMIN\", \"PROTTOTAL\", \"ALT\", \"AST\", \"GGT\", \"ALKPHOS\", \"BILITOTAL\", \"BILIDIRECT\", \"CHENTE\"  OTHER: No results found for: \"PH\", \"LACT\", \"A1C\", \"MELANI\", \"PHOS\", \"MAG\", \"LIPASE\", \"AMYLASE\", \"TSH\", \"T4\", \"T3\", \"CRP\", \"SED\"    Anesthesia Plan    ASA Status:  3    NPO Status:  NPO Appropriate    Anesthesia Type: General.     - Airway: ETT   Induction: Intravenous, Propofol.   Maintenance: Inhalation.        Consents    Anesthesia Plan(s) and associated risks, benefits, and realistic alternatives discussed. Questions answered and patient/representative(s) expressed understanding.     - Discussed:     - Discussed with:  Patient            Postoperative Care    Pain management: IV analgesics.   PONV prophylaxis: Dexamethasone or Solumedrol, " Ondansetron (or other 5HT-3)     Comments:               Lucas Harrington MD    I have reviewed the pertinent notes and labs in the chart from the past 30 days and (re)examined the patient.  Any updates or changes from those notes are reflected in this note.

## 2024-04-30 NOTE — ANESTHESIA PROCEDURE NOTES
"TAP Procedure Note    Pre-Procedure   Staff -        Anesthesiologist:  Enrique Galloway MD       Resident/Fellow: Kevon Hardy MD       Performed By: resident       Location: pre-op       Pre-Anesthestic Checklist: patient identified, IV checked, site marked, risks and benefits discussed, informed consent, monitors and equipment checked, pre-op evaluation, at physician/surgeon's request and post-op pain management  Timeout:       Correct Patient: Yes        Correct Procedure: Yes        Correct Site: Yes        Correct Position: Yes        Correct Laterality: Yes        Site Marked: Yes  Procedure Documentation  Procedure: TAP       Laterality: bilateral       Patient Position: supine       Patient Prep/Sterile Barriers: sterile gloves, mask       Skin prep: Chloraprep       Needle Type: short bevel       Needle Gauge: 21.        Needle Length (millimeters): 110        Ultrasound guided       1. Ultrasound was used to identify targeted nerve, plexus, vascular marker, or fascial plane and place a needle adjacent to it in real-time.       2. Ultrasound was used to visualize the spread of anesthetic in close proximity to the above referenced structure.       3. A permanent image is entered into the patient's record.    Assessment/Narrative         The placement was negative for: blood aspirated, painful injection and site bleeding       Paresthesias: No.       Bolus given via. no blood aspirated via catheter.        Secured via.        Insertion/Infusion Method: Single Shot       Complications: none    Medication(s) Administered   Bupivacaine 0.25% PF (Infiltration) - Infiltration   40 mL - 4/30/2024 3:39:00 PM  Dexmedetomidine 4 mcg/mL (Perineural) - Perineural   40 mcg - 4/30/2024 3:39:00 PM  Dexamethasone 10 mg/mL PF (Perineural) - Perineural   2 mg - 4/30/2024 3:39:00 PM    FOR [facility] ([facility]) ONLY:   Pain Team Contact information: please page the Pain Team Via Circular. Search \"Pain\". During " daytime hours, please page the attending first. At night please page the resident first.

## 2024-04-30 NOTE — OP NOTE
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.2]   Post-operative diagnosis Same as preoperative diagnosis   Procedure: Procedure(s):  DIAGNOSTIC LAPAROSCOPY, LAPAROSCOPIC PERITONEAL WASHINGS AND POSSIBLE BIOPSIES   Surgeon(s): Surgeons and Role:     * Jaguar Mathis MD - Primary     * Dulce Oliveira MD - Resident - Assisting     * Chastity Hawthorne MD - Resident - Assisting   Estimated blood loss: 1 ml    Specimens: ID Type Source Tests Collected by Time Destination   1 : Peritoneal Fluid Peritoneal Fluid Peritoneum NON-GYNECOLOGIC CYTOLOGY Jaguar Mathis MD 4/30/2024  5:15 PM    2 : Perintoneal Washings Washings Peritoneum NON-GYNECOLOGIC CYTOLOGY Jaguar Mathis MD 4/30/2024  5:25 PM       Findings: Ascites fluid. Narrowing of the gastric body with lymphatic congestion.          Description of procedure:   After informed consent, the patient was taken to the operating room, placed supine on the operating table with arms padded and extended. General anesthesia was induced. The abdomen was prepped and draped in the usual sterile fashion. A safety timeout was performed.    An incision was made at Washburn's point and pneumoperitoneum to 15 mmHg was established via Veress needle. A 5mm port was placed at this site under direct visualization and the abdomen was inspected. No injury had occurred from this entry. There was no evidence of peritoneal disease. An additional 5mm periumbilical port was placed.     15 ml of ascites fluid was suctioned and sent as specimen. The abdomen was then irrigated in aliquots in the right and left upper quadrants and pelvis with sterile saline. 1100 ml of fluid was suctioned and sent as peritoneal washing specimen. Hemostasis was achieved.    Pneumoperitoneum was released and the incisions were irrigated with sterile saline then closed with 4-0 subcuticular stitches and  dermabond. No complications occurred. All instrument and material counts were correct.     Dr. Mathis was present for the entirety of the procedure.    Dulce Oliveira MD  PGY-7, General Surgery      I was present through out the entirety of the case described above.  No evidence of peritoneal disease was grossly identified.  There was a small amount of ascites which we suctioned and sent for cytology.  We also performed peritoneal washings which was sent for cytology similarly.  The patient tolerated the procedure well, was extubated in the operating room, and transferred to recovery room in stable condition.  I was present throughout the entirety of the procedure.

## 2024-05-03 LAB
PATH REPORT.COMMENTS IMP SPEC: ABNORMAL
PATH REPORT.COMMENTS IMP SPEC: ABNORMAL
PATH REPORT.COMMENTS IMP SPEC: YES
PATH REPORT.FINAL DX SPEC: ABNORMAL
PATH REPORT.GROSS SPEC: ABNORMAL
PATH REPORT.MICROSCOPIC SPEC OTHER STN: ABNORMAL
PATH REPORT.RELEVANT HX SPEC: ABNORMAL

## 2024-05-10 ENCOUNTER — DOCUMENTATION ONLY (OUTPATIENT)
Dept: OTHER | Facility: CLINIC | Age: 69
End: 2024-05-10
Payer: MEDICARE

## 2024-05-29 ENCOUNTER — VIRTUAL VISIT (OUTPATIENT)
Dept: SURGERY | Facility: CLINIC | Age: 69
End: 2024-05-29
Attending: SURGERY
Payer: MEDICARE

## 2024-05-29 VITALS
SYSTOLIC BLOOD PRESSURE: 104 MMHG | WEIGHT: 132 LBS | DIASTOLIC BLOOD PRESSURE: 79 MMHG | BODY MASS INDEX: 21.21 KG/M2 | HEIGHT: 66 IN

## 2024-05-29 DIAGNOSIS — C16.2 MALIGNANT NEOPLASM OF BODY OF STOMACH (H): Primary | ICD-10-CM

## 2024-05-29 PROCEDURE — 99443 PR PHYSICIAN TELEPHONE EVALUATION 21-30 MIN: CPT | Mod: 93 | Performed by: SURGERY

## 2024-05-29 ASSESSMENT — PAIN SCALES - GENERAL: PAINLEVEL: NO PAIN (0)

## 2024-05-29 NOTE — LETTER
5/29/2024         RE: Karen Fuller  [address]  [place] [address]        Dear Colleague,    Thank you for referring your patient, Karen Fuller, to the [facility]. Please see a copy of my visit note below.    Virtual Visit Details    Type of service:  Telephone Visit   Phone call duration: 20 minutes   Originating Location (pt. Location): Home    Distant Location (provider location):  On-site    I spoke to Ms. Fuller at length about her peritoneal cytology.  I was concerned at the time of surgery because she did have a small amount of ascites although she did not have any evidence of gross peritoneal disease.  Her cytology reveals atypical cells both in the peritoneal fluid and the subsequent peritoneal washings that we did.  They do not meet the level to call them adenocarcinoma but I did discuss with the patient that atypical cells are very worrisome to me.  Obviously it is not normal to have atypical cells in your peritoneum particularly in the setting of a known gastric cancer.  Having said that without a definitive diagnosis I offered that we would typically give her the benefit of the doubt and continue on with therapy as we currently planned.    I will plan to send her cytology out for a second opinion.  I discussed that if that reveals the diagnosis of adenocarcinoma we would not move forward with any other surgical interventions.  If the situation remains unclear, we will have a heart-to-heart talk as to whether she would like to go forward with a gastrectomy knowing that the atypical cells in her peritoneum could develop into peritoneal disease shortly after surgery.    Obviously both the patient and I are disappointed about the lack of a black-and-white answer here.  It makes decision making very difficult.  She inquired about doing a redo peritoneal cytology at the end of chemotherapy which I think might actually be reasonable.  Lets see what the  second opinion regarding her existing cytology concludes and move forward from there.  She was satisfied with that plan.    I will have a phone conversation again with the patient as soon as we have our second opinion returned which will likely be a few weeks.    20 minutes was spent in phone conversation.  15 minutes was spent in review of recent pathology as well as communication with Dr. Perry, her medical oncologist.  5 minutes was spent in documentation.    The above was transcribed using Dragon voice recognition software that is now required for use by [facility] and [facility] in place of transcription of dictated notes.  This change may unfortunately lead into an increase in errors in the EMR. While I reviewed and edited the transcription, I may miss errors.  Please let me know of any of serious errors and I will addend the note.       Again, thank you for allowing me to participate in the care of your patient.        Sincerely,        Jaguar Mathis MD

## 2024-05-29 NOTE — NURSING NOTE
Is the patient currently in the state of [place]? YES    Visit mode:TELEPHONE    If the visit is dropped, the patient can be reconnected by: VIDEO VISIT: Send to e-mail at: [email or web address]    Will anyone else be joining the visit? NO  (If patient encounters technical issues they should call [phone]    How would you like to obtain your AVS? MyChart    Are changes needed to the allergy or medication list? No      Reason for visit: Video Visit (Follow Up )    Dee MONACO

## 2024-05-29 NOTE — LETTER
5/29/2024         RE: Karen Fuller  [address]  [place] [address]        Dear Colleague,    Thank you for referring your patient, Karen Fuller, to the [facility]. Please see a copy of my visit note below.    Virtual Visit Details    Type of service:  Telephone Visit   Phone call duration: 20 minutes   Originating Location (pt. Location): Home    Distant Location (provider location):  On-site    I spoke to Ms. Fuller at length about her peritoneal cytology.  I was concerned at the time of surgery because she did have a small amount of ascites although she did not have any evidence of gross peritoneal disease.  Her cytology reveals atypical cells both in the peritoneal fluid and the subsequent peritoneal washings that we did.  They do not meet the level to call them adenocarcinoma but I did discuss with the patient that atypical cells are very worrisome to me.  Obviously it is not normal to have atypical cells in your peritoneum particularly in the setting of a known gastric cancer.  Having said that without a definitive diagnosis I offered that we would typically give her the benefit of the doubt and continue on with therapy as we currently planned.    I will plan to send her cytology out for a second opinion.  I discussed that if that reveals the diagnosis of adenocarcinoma we would not move forward with any other surgical interventions.  If the situation remains unclear, we will have a heart-to-heart talk as to whether she would like to go forward with a gastrectomy knowing that the atypical cells in her peritoneum could develop into peritoneal disease shortly after surgery.    Obviously both the patient and I are disappointed about the lack of a black-and-white answer here.  It makes decision making very difficult.  She inquired about doing a redo peritoneal cytology at the end of chemotherapy which I think might actually be reasonable.  Lets see what the  second opinion regarding her existing cytology concludes and move forward from there.  She was satisfied with that plan.    I will have a phone conversation again with the patient as soon as we have our second opinion returned which will likely be a few weeks.    20 minutes was spent in phone conversation.  15 minutes was spent in review of recent pathology as well as communication with Dr. Perry, her medical oncologist.  5 minutes was spent in documentation.    The above was transcribed using Dragon voice recognition software that is now required for use by [facility] and [facility] in place of transcription of dictated notes.  This change may unfortunately lead into an increase in errors in the EMR. While I reviewed and edited the transcription, I may miss errors.  Please let me know of any of serious errors and I will addend the note.       Again, thank you for allowing me to participate in the care of your patient.        Sincerely,        Jaguar Mathis MD

## 2024-05-29 NOTE — PROGRESS NOTES
Virtual Visit Details    Type of service:  Telephone Visit   Phone call duration: 20 minutes   Originating Location (pt. Location): Home    Distant Location (provider location):  On-site    I spoke to Ms. Fuller at length about her peritoneal cytology.  I was concerned at the time of surgery because she did have a small amount of ascites although she did not have any evidence of gross peritoneal disease.  Her cytology reveals atypical cells both in the peritoneal fluid and the subsequent peritoneal washings that we did.  They do not meet the level to call them adenocarcinoma but I did discuss with the patient that atypical cells are very worrisome to me.  Obviously it is not normal to have atypical cells in your peritoneum particularly in the setting of a known gastric cancer.  Having said that without a definitive diagnosis I offered that we would typically give her the benefit of the doubt and continue on with therapy as we currently planned.    I will plan to send her cytology out for a second opinion.  I discussed that if that reveals the diagnosis of adenocarcinoma we would not move forward with any other surgical interventions.  If the situation remains unclear, we will have a heart-to-heart talk as to whether she would like to go forward with a gastrectomy knowing that the atypical cells in her peritoneum could develop into peritoneal disease shortly after surgery.    Obviously both the patient and I are disappointed about the lack of a black-and-white answer here.  It makes decision making very difficult.  She inquired about doing a redo peritoneal cytology at the end of chemotherapy which I think might actually be reasonable.  Lets see what the second opinion regarding her existing cytology concludes and move forward from there.  She was satisfied with that plan.    I will have a phone conversation again with the patient as soon as we have our second opinion returned which will likely be a few weeks.    20  minutes was spent in phone conversation.  15 minutes was spent in review of recent pathology as well as communication with Dr. Perry, her medical oncologist.  5 minutes was spent in documentation.    The above was transcribed using Dragon voice recognition software that is now required for use by [facility] and [facility] in place of transcription of dictated notes.  This change may unfortunately lead into an increase in errors in the EMR. While I reviewed and edited the transcription, I may miss errors.  Please let me know of any of serious errors and I will addend the note.

## 2024-06-07 ENCOUNTER — LAB REQUISITION (OUTPATIENT)
Dept: LAB | Facility: CLINIC | Age: 69
End: 2024-06-07
Payer: MEDICARE

## 2024-06-07 LAB
Lab: NORMAL
PERFORMING LABORATORY: NORMAL
SPECIMEN STATUS: NORMAL
TEST NAME: NORMAL

## 2024-06-11 LAB — MAYO MISC RESULT: NORMAL

## 2024-06-12 ENCOUNTER — TELEPHONE (OUTPATIENT)
Dept: SURGERY | Facility: CLINIC | Age: 69
End: 2024-06-12
Payer: MEDICARE

## 2024-06-12 DIAGNOSIS — C16.2 MALIGNANT NEOPLASM OF BODY OF STOMACH (H): Primary | ICD-10-CM

## 2024-06-12 NOTE — TELEPHONE ENCOUNTER
I called patient to discuss the results of the second opinion.  Her peritoneal fluid was sent for evaluation to the [facility].  The findings were suspicious for adenocarcinoma.  I discussed with the patient that in my mind this is a positive result and that gastrectomy is not likely to improve her survival over chemotherapy alone.    I discussed the possibility of visiting the [facility], Dr. Milan Martinez to discuss the possibility of enrolling in a clinical trial which includes HIPEC and ultimately gastrectomy in well selected patients.  She will be meeting with her medical oncologist Dr. Perry later this week to discuss treatment options moving forward which I think is appropriate.    No further follow-up with me unless the patient has questions or concerns.    The above was transcribed using Dragon voice recognition software that is now required for use by [facility] and [facility] in place of transcription of dictated notes.  This change may unfortunately lead into an increase in errors in the EMR. While I reviewed and edited the transcription, I may miss errors.  Please let me know of any of serious errors and I will addend the note.

## 2024-12-15 ENCOUNTER — HEALTH MAINTENANCE LETTER (OUTPATIENT)
Age: 69
End: 2024-12-15

## (undated) DEVICE — ENDO TROCAR FIRST ENTRY KII FIOS Z-THRD 05X100MM CTF03

## (undated) DEVICE — ENDO TROCAR SLEEVE KII Z-THREADED 05X100MM CTS02

## (undated) DEVICE — DRAPE SHEET REV FOLD 3/4 9349

## (undated) DEVICE — NDL INSUFFLATION 13GA 120MM C2201

## (undated) DEVICE — TUBING SMOKE EVAC PNEUMOCLEAR HIGH FLOW 0620050250

## (undated) DEVICE — SU MONOCRYL 4-0 PS-2 27" UND Y426H

## (undated) DEVICE — ANTIFOG SOLUTION W/FOAM PAD CF-1002

## (undated) DEVICE — SUCTION IRR STRYKERFLOW II W/TIP 250-070-520

## (undated) DEVICE — SPECIMEN TRAP MUCOUS 40ML LUKI C30200A

## (undated) DEVICE — TUBING SUCTION 10'X3/16" N510

## (undated) DEVICE — DRSG TELFA 3X8" 1238

## (undated) DEVICE — DRAPE IOBAN INCISE 23X17" 6650EZ

## (undated) DEVICE — SU DERMABOND ADVANCED .7ML DNX12

## (undated) DEVICE — Device

## (undated) RX ORDER — HYDROMORPHONE HCL IN WATER/PF 6 MG/30 ML
PATIENT CONTROLLED ANALGESIA SYRINGE INTRAVENOUS
Status: DISPENSED
Start: 2024-04-30

## (undated) RX ORDER — FENTANYL CITRATE 50 UG/ML
INJECTION, SOLUTION INTRAMUSCULAR; INTRAVENOUS
Status: DISPENSED
Start: 2024-04-30

## (undated) RX ORDER — ENOXAPARIN SODIUM 100 MG/ML
INJECTION SUBCUTANEOUS
Status: DISPENSED
Start: 2024-04-30

## (undated) RX ORDER — CLINDAMYCIN PHOSPHATE 900 MG/50ML
INJECTION, SOLUTION INTRAVENOUS
Status: DISPENSED
Start: 2024-04-30

## (undated) RX ORDER — ONDANSETRON 2 MG/ML
INJECTION INTRAMUSCULAR; INTRAVENOUS
Status: DISPENSED
Start: 2024-04-30

## (undated) RX ORDER — ACETAMINOPHEN 325 MG/1
TABLET ORAL
Status: DISPENSED
Start: 2024-04-30